# Patient Record
Sex: FEMALE | Race: WHITE | NOT HISPANIC OR LATINO | Employment: FULL TIME | URBAN - METROPOLITAN AREA
[De-identification: names, ages, dates, MRNs, and addresses within clinical notes are randomized per-mention and may not be internally consistent; named-entity substitution may affect disease eponyms.]

---

## 2019-06-18 ENCOUNTER — OFFICE VISIT (OUTPATIENT)
Dept: CARDIOLOGY | Facility: CLINIC | Age: 69
End: 2019-06-18

## 2019-06-18 VITALS
OXYGEN SATURATION: 95 % | SYSTOLIC BLOOD PRESSURE: 128 MMHG | BODY MASS INDEX: 35.16 KG/M2 | DIASTOLIC BLOOD PRESSURE: 83 MMHG | HEIGHT: 65 IN | HEART RATE: 78 BPM | WEIGHT: 211 LBS

## 2019-06-18 DIAGNOSIS — R55 SYNCOPE AND COLLAPSE: ICD-10-CM

## 2019-06-18 DIAGNOSIS — E10.9 TYPE 1 DIABETES MELLITUS WITHOUT COMPLICATION (HCC): ICD-10-CM

## 2019-06-18 DIAGNOSIS — I10 ESSENTIAL HYPERTENSION: Primary | ICD-10-CM

## 2019-06-18 DIAGNOSIS — R42 DIZZINESS: ICD-10-CM

## 2019-06-18 PROCEDURE — 99214 OFFICE O/P EST MOD 30 MIN: CPT | Performed by: INTERNAL MEDICINE

## 2019-06-18 RX ORDER — ALBUTEROL SULFATE 90 UG/1
AEROSOL, METERED RESPIRATORY (INHALATION)
Refills: 0 | COMMUNITY
Start: 2019-04-01

## 2019-06-18 RX ORDER — CELECOXIB 200 MG/1
200 CAPSULE ORAL DAILY
Refills: 1 | COMMUNITY
Start: 2019-05-14

## 2019-06-18 RX ORDER — METOPROLOL SUCCINATE 50 MG/1
50 TABLET, EXTENDED RELEASE ORAL DAILY
Refills: 1 | COMMUNITY
Start: 2019-05-28

## 2019-06-18 RX ORDER — ASPIRIN 81 MG/1
81 TABLET, CHEWABLE ORAL DAILY
COMMUNITY

## 2019-06-18 RX ORDER — RALOXIFENE HYDROCHLORIDE 60 MG/1
60 TABLET, FILM COATED ORAL DAILY
Refills: 1 | COMMUNITY
Start: 2019-05-14

## 2019-06-18 RX ORDER — ESTRADIOL 0.1 MG/G
CREAM VAGINAL
Refills: 1 | COMMUNITY
Start: 2019-03-15

## 2019-06-18 RX ORDER — GABAPENTIN 300 MG/1
300 CAPSULE ORAL 4 TIMES DAILY
COMMUNITY

## 2019-06-18 RX ORDER — ALLOPURINOL 300 MG/1
300 TABLET ORAL DAILY
Refills: 4 | COMMUNITY
Start: 2019-05-27

## 2019-06-18 RX ORDER — ONDANSETRON 4 MG/1
TABLET, FILM COATED ORAL
Refills: 0 | COMMUNITY
Start: 2019-06-04

## 2019-06-18 NOTE — PROGRESS NOTES
"    Subjective:     Encounter Date:06/18/2019      Patient ID: Jayde Ely is a 69 y.o. female.    Chief Complaint:  History of Present Illness    The following portions of the patient's history were reviewed and updated as appropriate: allergies, current medications, past family history, past medical history, past social history, past surgical history and problem list.    Allergies not on file    No current outpatient medications on file.    No family history on file.    No past medical history on file.    No past surgical history on file.    Social History     Socioeconomic History   • Marital status:      Spouse name: Not on file   • Number of children: Not on file   • Years of education: Not on file   • Highest education level: Not on file       ROS  The following systems were reviewed as they relate to the cardiovascular system: Constitutional, Eyes, ENT, Cardiovascular, Respiratory, Gastrointestinal, Integumentary, Neurological, Psychiatric, Hematologic, Endocrine, Musculoskeletal, and Genitourinary. The pertinent cardiovascular findings are reported above with all other cardiovascular points within those systems being negative.       Objective:         There were no vitals filed for this visit.    Physical Exam    Lab/EKG/Echo Review:   EKG: {ekg [id number]:848604::\"normal EKG, normal sinus rhythm\",\"unchanged from previous tracings\"}.  Procedures        Assessment:         No diagnosis found.       Plan:               "

## 2019-06-18 NOTE — PROGRESS NOTES
Subjective:     Encounter Date:06/18/2019      Patient ID: Jayde Ely is a 69 y.o. female.    Chief Complaint:        Dear ,    It is my pleasure seeing patient Jayde Ely in the office today.  Patient is a pleasant 69-year-old female that was recently hospitalized at [facility] for symptoms of recurrent syncope.    Patient has a prior medical history that significant for history of hypertension hyperlipidemia non-insulin-dependent diabetes  Presented with a 2 episodes of syncope with loss of consciousness and also head trauma  Syncope was attributed to dehydration and volume loss secondary to significant diarrhea  Hypokalemia resolved per patient  Patient denies any new cardiac complaints denies any symptoms of chest pain shortness of breath  Still has some mild nonspecific dizziness and some vertigo currently being attributed to likely concussion and head injury  She denies any palpitations  No recurrence of syncope  Echocardiogram with normal LV systolic function  Stress test with no evidence of any significant inducible ischemia  Discussed  with patient about need for further evaluation and work-up if she has any recurrence of symptoms  Continue close monitoring  Follow-up in office in 3 months          The following portions of the patient's history were reviewed and updated as appropriate: allergies, current medications, past family history, past medical history, past social history, past surgical history and problem list.    Allergies   Allergen Reactions   • Hydrocodone Hives   • Sulfa Antibiotics Hives         Current Outpatient Medications:   •  allopurinol (ZYLOPRIM) 300 MG tablet, Take 300 mg by mouth Daily., Disp: , Rfl: 4  •  aspirin 81 MG chewable tablet, Chew 81 mg Daily., Disp: , Rfl:   •  celecoxib (CeleBREX) 200 MG capsule, Take 200 mg by mouth Daily., Disp: , Rfl: 1  •  ESTRACE VAGINAL 0.1 MG/GM vaginal cream, EVERY OTHER DAY, Disp: , Rfl: 1  •   gabapentin (NEURONTIN) 300 MG capsule, Take 300 mg by mouth 4 (Four) Times a Day., Disp: , Rfl:   •  metoprolol succinate XL (TOPROL-XL) 50 MG 24 hr tablet, Take 50 mg by mouth Daily., Disp: , Rfl: 1  •  ondansetron (ZOFRAN) 4 MG tablet, TAKE 1 (ONE) TABLET EVERY 6-8 HOURS AS NEEDED, Disp: , Rfl: 0  •  raloxifene (EVISTA) 60 MG tablet, Take 60 mg by mouth Daily., Disp: , Rfl: 1  •  VENTOLIN  (90 Base) MCG/ACT inhaler, USE 2 PUFFS BY MOUTH EVERY 6 HOURS AS NEEDED FOR WHEEZING OR SHORTNESS OF BREATH, Disp: , Rfl: 0    Family History   Problem Relation Age of Onset   • Diabetes type II Mother    • Heart failure Father        Past Medical History:   Diagnosis Date   • Asthma    • Diabetes mellitus (CMS/HCC)    • Hypertension        History reviewed. No pertinent surgical history.    Social History     Socioeconomic History   • Marital status:      Spouse name: Not on file   • Number of children: Not on file   • Years of education: Not on file   • Highest education level: Not on file   Tobacco Use   • Smoking status: Never Smoker   • Smokeless tobacco: Never Used   Substance and Sexual Activity   • Alcohol use: No     Frequency: Never       Review of Systems   Constitution: Negative for chills, decreased appetite and malaise/fatigue.   HENT: Negative for congestion.    Eyes: Negative for blurred vision and double vision.   Cardiovascular: Negative for chest pain, dyspnea on exertion, leg swelling, near-syncope, orthopnea and palpitations.   Respiratory: Negative for cough and shortness of breath.    Endocrine: Negative for cold intolerance and heat intolerance.   Hematologic/Lymphatic: Negative for adenopathy. Does not bruise/bleed easily.   Skin: Negative for flushing and rash.   Musculoskeletal: Negative for arthritis and falls.   Gastrointestinal: Negative for bloating and abdominal pain.   Neurological: Negative for dizziness and focal weakness.     The following systems were reviewed as they relate to  the cardiovascular system: Constitutional, Eyes, ENT, Cardiovascular, Respiratory, Gastrointestinal, Integumentary, Neurological, Psychiatric, Hematologic, Endocrine, Musculoskeletal, and Genitourinary. The pertinent cardiovascular findings are reported above with all other cardiovascular points within those systems being negative.       Objective:         Vitals:    06/18/19 1312   BP: 128/83   Pulse: 78   SpO2: 95%       Physical Exam   Constitutional: She is oriented to person, place, and time. She appears well-developed and well-nourished.   HENT:   Head: Normocephalic and atraumatic.   Eyes: Conjunctivae are normal. Pupils are equal, round, and reactive to light.   Neck: Normal range of motion. Neck supple. No thyromegaly present.   Cardiovascular: Normal rate, regular rhythm, S1 normal, S2 normal and intact distal pulses.   Pulmonary/Chest: Effort normal and breath sounds normal.   Abdominal: Soft. Bowel sounds are normal.   Musculoskeletal: She exhibits no edema.   Neurological: She is alert and oriented to person, place, and time.   Skin: Skin is warm.   Nursing note and vitals reviewed.            Assessment:       Hypertension  Hyperlipidemia  Dizziness and syncope  Non-insulin-dependent diabetes     Plan:         Patient has a prior medical history that significant for history of hypertension hyperlipidemia non-insulin-dependent diabetes  Presented with a 2 episodes of syncope with loss of consciousness and also head trauma  Syncope was attributed to dehydration and volume loss secondary to significant diarrhea  Hypokalemia resolved per patient  Patient denies any new cardiac complaints denies any symptoms of chest pain shortness of breath  Still has some mild nonspecific dizziness and some vertigo currently being attributed to likely concussion and head injury  She denies any palpitations  No recurrence of syncope  Echocardiogram with normal LV systolic function  Stress test with no evidence of any  significant inducible ischemia  Discussed  with patient about need for further evaluation and work-up if she has any recurrence of symptoms  Continue close monitoring  Follow-up in office in 3 months

## 2019-09-17 ENCOUNTER — OFFICE VISIT (OUTPATIENT)
Dept: CARDIOLOGY | Facility: CLINIC | Age: 69
End: 2019-09-17

## 2019-09-17 VITALS
HEART RATE: 77 BPM | RESPIRATION RATE: 18 BRPM | HEIGHT: 65 IN | DIASTOLIC BLOOD PRESSURE: 85 MMHG | BODY MASS INDEX: 36.09 KG/M2 | WEIGHT: 216.6 LBS | SYSTOLIC BLOOD PRESSURE: 137 MMHG | OXYGEN SATURATION: 96 %

## 2019-09-17 DIAGNOSIS — E66.01 MORBIDLY OBESE (HCC): ICD-10-CM

## 2019-09-17 PROCEDURE — 99213 OFFICE O/P EST LOW 20 MIN: CPT | Performed by: INTERNAL MEDICINE

## 2019-09-17 NOTE — PROGRESS NOTES
Subjective:     Encounter Date:09/17/2019      Patient ID: Jayde Ely is a 69 y.o. female.    Chief Complaint:    Dear ,    It is my pleasure seeing patient Jayde Ely in the office today.  Patient is a pleasant 69-year-old female that was recently hospitalized at [facility] for symptoms of recurrent syncope.    Patient has a prior medical history that significant for history of hypertension hyperlipidemia non-insulin-dependent diabetes  Presented with a 2 episodes of syncope with loss of consciousness and also head trauma  Syncope was attributed to dehydration and volume loss secondary to significant diarrhea  Hypokalemia resolved per patient  Patient denies any new cardiac complaints denies any symptoms of chest pain shortness of breath  Patient dizziness and vertigo symptoms got much better  She denies any palpitations  No recurrence of syncope  Echocardiogram with normal LV systolic function  Stress test with no evidence of any significant inducible ischemia  Discussed  with patient about need for further evaluation and work-up if she has any recurrence of symptoms  Continue close monitoring  Follow-up in office in 6 months              The following portions of the patient's history were reviewed and updated as appropriate: allergies, current medications, past family history, past medical history, past social history, past surgical history and problem list.    Allergies   Allergen Reactions   • Hydrocodone Hives   • Sulfa Antibiotics Hives         Current Outpatient Medications:   •  allopurinol (ZYLOPRIM) 300 MG tablet, Take 300 mg by mouth Daily., Disp: , Rfl: 4  •  aspirin 81 MG chewable tablet, Chew 81 mg Daily., Disp: , Rfl:   •  celecoxib (CeleBREX) 200 MG capsule, Take 200 mg by mouth Daily., Disp: , Rfl: 1  •  ESTRACE VAGINAL 0.1 MG/GM vaginal cream, EVERY OTHER DAY, Disp: , Rfl: 1  •  gabapentin (NEURONTIN) 300 MG capsule, Take 300 mg by mouth 4 (Four)  Times a Day., Disp: , Rfl:   •  metoprolol succinate XL (TOPROL-XL) 50 MG 24 hr tablet, Take 50 mg by mouth Daily., Disp: , Rfl: 1  •  ondansetron (ZOFRAN) 4 MG tablet, TAKE 1 (ONE) TABLET EVERY 6-8 HOURS AS NEEDED, Disp: , Rfl: 0  •  raloxifene (EVISTA) 60 MG tablet, Take 60 mg by mouth Daily., Disp: , Rfl: 1  •  VENTOLIN  (90 Base) MCG/ACT inhaler, USE 2 PUFFS BY MOUTH EVERY 6 HOURS AS NEEDED FOR WHEEZING OR SHORTNESS OF BREATH, Disp: , Rfl: 0    Family History   Problem Relation Age of Onset   • Diabetes type II Mother    • Heart failure Father        Past Medical History:   Diagnosis Date   • Asthma    • Diabetes mellitus (CMS/HCC)    • Hypertension        History reviewed. No pertinent surgical history.    Social History     Socioeconomic History   • Marital status:      Spouse name: Not on file   • Number of children: Not on file   • Years of education: Not on file   • Highest education level: Not on file   Tobacco Use   • Smoking status: Never Smoker   • Smokeless tobacco: Never Used   Substance and Sexual Activity   • Alcohol use: No     Frequency: Never       Review of Systems   Constitution: Negative for chills, decreased appetite and malaise/fatigue.   HENT: Negative for congestion.    Eyes: Negative for blurred vision and double vision.   Cardiovascular: Negative for chest pain, dyspnea on exertion, irregular heartbeat, leg swelling, near-syncope, orthopnea, palpitations, paroxysmal nocturnal dyspnea and syncope.   Respiratory: Negative for cough and shortness of breath.    Hematologic/Lymphatic: Negative for adenopathy. Does not bruise/bleed easily.   Skin: Negative for rash.   Gastrointestinal: Negative for bloating and abdominal pain.   Neurological: Negative for dizziness and focal weakness.            Objective:         Vitals:    09/17/19 0906   BP: 137/85   Pulse: 77   Resp: 18   SpO2: 96%       Physical Exam   Constitutional: She is oriented to person, place, and time. She appears  well-developed and well-nourished.   HENT:   Head: Normocephalic and atraumatic.   Eyes: Conjunctivae are normal. Pupils are equal, round, and reactive to light.   Neck: Normal range of motion. Neck supple. No thyromegaly present.   Cardiovascular: Normal rate, regular rhythm, S1 normal, S2 normal and intact distal pulses.   Pulmonary/Chest: Effort normal and breath sounds normal.   Abdominal: Soft. Bowel sounds are normal.   Musculoskeletal: She exhibits no edema.   Neurological: She is alert and oriented to person, place, and time.   Skin: Skin is warm.   Nursing note and vitals reviewed.